# Patient Record
Sex: FEMALE | Race: WHITE | HISPANIC OR LATINO | URBAN - METROPOLITAN AREA
[De-identification: names, ages, dates, MRNs, and addresses within clinical notes are randomized per-mention and may not be internally consistent; named-entity substitution may affect disease eponyms.]

---

## 2023-04-14 ENCOUNTER — APPOINTMENT (RX ONLY)
Dept: URBAN - METROPOLITAN AREA CLINIC 15 | Facility: CLINIC | Age: 28
Setting detail: DERMATOLOGY
End: 2023-04-14

## 2023-04-14 VITALS — HEIGHT: 65 IN

## 2023-04-14 DIAGNOSIS — L71.8 OTHER ROSACEA: ICD-10-CM

## 2023-04-14 DIAGNOSIS — L30.1 DYSHIDROSIS [POMPHOLYX]: ICD-10-CM

## 2023-04-14 PROCEDURE — ? COUNSELING

## 2023-04-14 PROCEDURE — 99204 OFFICE O/P NEW MOD 45 MIN: CPT

## 2023-04-14 PROCEDURE — ? PRESCRIPTION MEDICATION MANAGEMENT

## 2023-04-14 PROCEDURE — ? PRESCRIPTION

## 2023-04-14 RX ORDER — CLOBETASOL PROPIONATE 0.5 MG/G
1 CREAM TOPICAL BID
Qty: 30 | Refills: 1 | Status: ERX | COMMUNITY
Start: 2023-04-14

## 2023-04-14 RX ORDER — AZELAIC ACID 0.15 G/G
1 AEROSOL, FOAM TOPICAL QHS
Qty: 1 | Refills: 1 | Status: ERX | COMMUNITY
Start: 2023-04-14

## 2023-04-14 RX ADMIN — AZELAIC ACID 1: 0.15 AEROSOL, FOAM TOPICAL at 00:00

## 2023-04-14 RX ADMIN — CLOBETASOL PROPIONATE 1: 0.5 CREAM TOPICAL at 00:00

## 2023-04-14 ASSESSMENT — LOCATION SIMPLE DESCRIPTION DERM
LOCATION SIMPLE: RIGHT CHEEK
LOCATION SIMPLE: LEFT CHEEK
LOCATION SIMPLE: RIGHT PLANTAR SURFACE

## 2023-04-14 ASSESSMENT — LOCATION DETAILED DESCRIPTION DERM
LOCATION DETAILED: RIGHT PLANTAR FOREFOOT OVERLYING 2ND METATARSAL
LOCATION DETAILED: LEFT INFERIOR CENTRAL MALAR CHEEK
LOCATION DETAILED: RIGHT INFERIOR CENTRAL MALAR CHEEK

## 2023-04-14 ASSESSMENT — LOCATION ZONE DERM
LOCATION ZONE: FACE
LOCATION ZONE: FEET

## 2023-04-14 NOTE — PROCEDURE: PRESCRIPTION MEDICATION MANAGEMENT
Detail Level: Zone
Initiate Treatment: .\\n\\nAM\\n\\nWash face with gentle cleanser \\nVitamin [address]  [employer]) \\nApply Sunscreen SPF 50+\\nApply moisturizer (Ultra Repair Cream by first aid beauty patient has at home)\\n\\nPM\\n\\nWash face with gentle cleanser \\nazelaic acid 15 % topical foam Apply a small amount to full face every night\\nApply moisturizer (Ultra Repair Cream by first aid beauty)\\n\\n.
Render In Strict Bullet Format?: No
Plan: .\\n\\nIPL laser is being recommended to treat Rosacea $400 each session

## 2023-04-14 NOTE — PROCEDURE: COUNSELING
Sunscreen Recommendations: SPF [address] [place] or higher
Detail Level: Zone
Moisturizer Recommendations: La Roche Posay Double repair moisturizer
Ipl Recommendations: IPl face x 4-5 treatments spaced 1 month apart. $400 each session.
Topical Retinoids Recommendations: Retinol
Topical Steroid Recommendations: Diprolene ointment
Moisturizer Recommendations: Edwige Orozco from [employer]

## 2023-06-14 ENCOUNTER — APPOINTMENT (RX ONLY)
Dept: URBAN - METROPOLITAN AREA CLINIC 15 | Facility: CLINIC | Age: 28
Setting detail: DERMATOLOGY
End: 2023-06-14

## 2023-06-14 VITALS — WEIGHT: 140 LBS | HEIGHT: 65 IN

## 2023-06-14 DIAGNOSIS — L71.8 OTHER ROSACEA: ICD-10-CM | Status: WELL CONTROLLED

## 2023-06-14 DIAGNOSIS — L30.1 DYSHIDROSIS [POMPHOLYX]: ICD-10-CM | Status: RESOLVED

## 2023-06-14 PROCEDURE — ? COUNSELING

## 2023-06-14 PROCEDURE — 99214 OFFICE O/P EST MOD 30 MIN: CPT

## 2023-06-14 PROCEDURE — ? PRESCRIPTION MEDICATION MANAGEMENT

## 2023-06-14 PROCEDURE — ? PRESCRIPTION

## 2023-06-14 RX ORDER — CLOBETASOL PROPIONATE 0.5 MG/G
1 CREAM TOPICAL BID
Qty: 30 | Refills: 2 | Status: ERX

## 2023-06-14 RX ORDER — AZELAIC ACID 0.15 G/G
1 AEROSOL, FOAM TOPICAL QHS
Qty: 1 | Refills: 7 | Status: ERX

## 2023-06-14 ASSESSMENT — LOCATION DETAILED DESCRIPTION DERM
LOCATION DETAILED: SUBXIPHOID
LOCATION DETAILED: RIGHT PLANTAR FOREFOOT OVERLYING 2ND METATARSAL
LOCATION DETAILED: LEFT INFERIOR CENTRAL MALAR CHEEK
LOCATION DETAILED: RIGHT INFERIOR CENTRAL MALAR CHEEK

## 2023-06-14 ASSESSMENT — LOCATION ZONE DERM
LOCATION ZONE: FACE
LOCATION ZONE: TRUNK
LOCATION ZONE: FEET

## 2023-06-14 ASSESSMENT — LOCATION SIMPLE DESCRIPTION DERM
LOCATION SIMPLE: RIGHT CHEEK
LOCATION SIMPLE: RIGHT PLANTAR SURFACE
LOCATION SIMPLE: LEFT CHEEK
LOCATION SIMPLE: ABDOMEN

## 2023-06-14 NOTE — PROCEDURE: PRESCRIPTION MEDICATION MANAGEMENT
Detail Level: Zone
Continue Regimen: .\\n\\nAM\\n\\nWash face with gentle cleanser \\nVitamin [address]  [employer]) \\nApply Sunscreen SPF 50+\\nApply moisturizer (Ultra Repair Cream by first aid beauty patient has at home)\\n\\nPM\\n\\nWash face with gentle cleanser \\nazelaic acid 15 % topical foam Apply a small amount to full face every night\\nApply moisturizer (Ultra Repair Cream by first aid beauty)\\n\\n.
Render In Strict Bullet Format?: No
Plan: .\\n\\nIPL laser is being recommended to treat Rosacea $400 each session
Continue Regimen: .\\n\\nUse as needed:\\n\\nClobetasol 0.05 % topical cream Apply twice daily to eczema up to 2 weeks/month as needed.

## 2023-06-14 NOTE — PROCEDURE: COUNSELING
Sunscreen Recommendations: SPF 27 or higher
Detail Level: Zone
Moisturizer Recommendations: La Roche Posay Double repair moisturizer
Ipl Recommendations: IPl face x 4-5 treatments spaced 1 month apart. $400 each session.
Topical Retinoids Recommendations: Retinol
Topical Steroid Recommendations: Diprolene ointment
Moisturizer Recommendations: Ammon Goddard from [employer]

## 2023-12-15 ENCOUNTER — APPOINTMENT (RX ONLY)
Dept: URBAN - METROPOLITAN AREA CLINIC 15 | Facility: CLINIC | Age: 28
Setting detail: DERMATOLOGY
End: 2023-12-15

## 2023-12-15 VITALS — HEIGHT: 64 IN | WEIGHT: 145 LBS

## 2023-12-15 DIAGNOSIS — L30.1 DYSHIDROSIS [POMPHOLYX]: ICD-10-CM

## 2023-12-15 DIAGNOSIS — L82.0 INFLAMED SEBORRHEIC KERATOSIS: ICD-10-CM

## 2023-12-15 DIAGNOSIS — L71.8 OTHER ROSACEA: ICD-10-CM

## 2023-12-15 PROCEDURE — 17110 DESTRUCTION B9 LES UP TO 14: CPT

## 2023-12-15 PROCEDURE — ? BENIGN DESTRUCTION

## 2023-12-15 PROCEDURE — ? PRESCRIPTION MEDICATION MANAGEMENT

## 2023-12-15 PROCEDURE — ? COUNSELING

## 2023-12-15 PROCEDURE — 99214 OFFICE O/P EST MOD 30 MIN: CPT | Mod: 25

## 2023-12-15 ASSESSMENT — LOCATION DETAILED DESCRIPTION DERM
LOCATION DETAILED: RIGHT PLANTAR FOREFOOT OVERLYING 2ND METATARSAL
LOCATION DETAILED: RIGHT INFERIOR CENTRAL MALAR CHEEK
LOCATION DETAILED: LEFT CENTRAL BUCCAL CHEEK
LOCATION DETAILED: LEFT INFERIOR CENTRAL MALAR CHEEK
LOCATION DETAILED: RIGHT MEDIAL MANDIBULAR CHEEK

## 2023-12-15 ASSESSMENT — LOCATION SIMPLE DESCRIPTION DERM
LOCATION SIMPLE: RIGHT PLANTAR SURFACE
LOCATION SIMPLE: LEFT CHEEK
LOCATION SIMPLE: RIGHT CHEEK

## 2023-12-15 ASSESSMENT — LOCATION ZONE DERM
LOCATION ZONE: FEET
LOCATION ZONE: FACE

## 2023-12-15 NOTE — PROCEDURE: COUNSELING
Sunscreen Recommendations: SPF 30 or higher
Detail Level: Zone
Moisturizer Recommendations: La Roche Posay Double repair moisturizer
Ipl Recommendations: IPl face x 4-5 treatments spaced 1 month apart. $400 each session.
Topical Retinoids Recommendations: Retinol
Topical Steroid Recommendations: Diprolene ointment
Moisturizer Recommendations: Giovani Moe
Detail Level: Simple

## 2023-12-15 NOTE — PROCEDURE: BENIGN DESTRUCTION
Render Post-Care Instructions In Note?: yes
Include Z78.9 (Other Specified Conditions Influencing Health Status) As An Associated Diagnosis?: No
Treatment Number (Will Not Render If 0): 0
Medical Necessity Information: It is in your best interest to select a reason for this procedure from the list below. All of these items fulfill various CMS LCD requirements except the new and changing color options.
Consent: The patient's consent was obtained including but not limited to risks of crusting, scabbing, blistering, scarring, darker or lighter pigmentary change, recurrence, incomplete removal and infection.
Anesthesia Volume In Cc: 1
Detail Level: Simple
Medical Necessity Clause: This procedure was medically necessary because the lesions that were treated were:
Post-Care Instructions: I reviewed with the patient in detail post-care instructions. Patient is to wear sunprotection, and avoid picking at any of the treated lesions. Pt may apply Vaseline to the areas daily.

## 2023-12-15 NOTE — PROCEDURE: PRESCRIPTION MEDICATION MANAGEMENT
Detail Level: Zone
Continue Regimen: .\\n\\nAM\\n\\nWash face with gentle cleanser \\nVitamin [employer] [place]  ([employer]) \\nApply Sunscreen SPF 50+\\nApply moisturizer (Ultra Repair Cream by first aid beauty patient has at home)\\n\\nPM\\n\\nWash face with gentle cleanser \\nazelaic acid 15 % topical foam Apply a small amount to full face every night\\nApply moisturizer (Ultra Repair Cream by first aid beauty)\\n\\n.
Render In Strict Bullet Format?: No
Continue Regimen: .\\n\\nUse as needed:\\n- Clobetasol 0.05 % topical cream Apply twice daily to eczema up to 2 weeks/month as needed.\\n\\n.